# Patient Record
Sex: FEMALE | Race: WHITE | Employment: OTHER | URBAN - METROPOLITAN AREA
[De-identification: names, ages, dates, MRNs, and addresses within clinical notes are randomized per-mention and may not be internally consistent; named-entity substitution may affect disease eponyms.]

---

## 2017-01-15 ENCOUNTER — APPOINTMENT (OUTPATIENT)
Dept: LAB | Facility: HOSPITAL | Age: 72
End: 2017-01-15
Attending: INTERNAL MEDICINE
Payer: COMMERCIAL

## 2017-01-15 DIAGNOSIS — Z86.711 PERSONAL HISTORY OF PE (PULMONARY EMBOLISM): ICD-10-CM

## 2017-01-15 LAB
INR BLD: 2.4 (ref 0.9–1.2)
PROTHROMBIN TIME: 26.3 SECONDS (ref 11.8–14.5)

## 2017-01-15 PROCEDURE — 85610 PROTHROMBIN TIME: CPT

## 2017-01-15 PROCEDURE — 36415 COLL VENOUS BLD VENIPUNCTURE: CPT

## 2017-02-12 ENCOUNTER — APPOINTMENT (OUTPATIENT)
Dept: LAB | Facility: HOSPITAL | Age: 72
End: 2017-02-12
Attending: INTERNAL MEDICINE
Payer: COMMERCIAL

## 2017-02-12 DIAGNOSIS — Z86.711 PERSONAL HISTORY OF PE (PULMONARY EMBOLISM): ICD-10-CM

## 2017-02-12 LAB
INR BLD: 2.2 (ref 0.9–1.2)
PROTHROMBIN TIME: 24.4 SECONDS (ref 11.8–14.5)

## 2017-02-12 PROCEDURE — 36415 COLL VENOUS BLD VENIPUNCTURE: CPT

## 2017-02-12 PROCEDURE — 85610 PROTHROMBIN TIME: CPT

## 2017-03-15 ENCOUNTER — TELEPHONE (OUTPATIENT)
Dept: PULMONOLOGY | Facility: CLINIC | Age: 72
End: 2017-03-15

## 2017-03-15 NOTE — TELEPHONE ENCOUNTER
Letter and Order for April CT from [address] [place] [address] mailed to pt. Managed Care: please process prior auth and inform pt.

## 2017-03-16 PROCEDURE — 85610 PROTHROMBIN TIME: CPT | Performed by: INTERNAL MEDICINE

## 2017-03-16 PROCEDURE — 36415 COLL VENOUS BLD VENIPUNCTURE: CPT | Performed by: INTERNAL MEDICINE

## 2017-03-18 RX ORDER — VALSARTAN 160 MG/1
TABLET ORAL
Qty: 90 TABLET | Refills: 1 | Status: SHIPPED | OUTPATIENT
Start: 2017-03-18 | End: 2017-08-29

## 2017-04-07 ENCOUNTER — LAB ENCOUNTER (OUTPATIENT)
Dept: LAB | Facility: HOSPITAL | Age: 72
End: 2017-04-07
Attending: INTERNAL MEDICINE
Payer: COMMERCIAL

## 2017-04-07 ENCOUNTER — HOSPITAL ENCOUNTER (OUTPATIENT)
Dept: CT IMAGING | Facility: HOSPITAL | Age: 72
Discharge: HOME | End: 2017-04-07
Attending: INTERNAL MEDICINE
Payer: COMMERCIAL

## 2017-04-07 DIAGNOSIS — E11.9 DIABETES MELLITUS (HCC): Primary | ICD-10-CM

## 2017-04-07 DIAGNOSIS — I10 ESSENTIAL HYPERTENSION, MALIGNANT: ICD-10-CM

## 2017-04-07 PROCEDURE — 82565 ASSAY OF CREATININE: CPT

## 2017-04-07 PROCEDURE — 36415 COLL VENOUS BLD VENIPUNCTURE: CPT

## 2017-04-07 PROCEDURE — 71260 CT THORAX DX C+: CPT

## 2017-04-07 PROCEDURE — 83036 HEMOGLOBIN GLYCOSYLATED A1C: CPT

## 2017-04-07 PROCEDURE — 80069 RENAL FUNCTION PANEL: CPT

## 2017-04-13 ENCOUNTER — TELEPHONE (OUTPATIENT)
Dept: PULMONOLOGY | Facility: CLINIC | Age: 72
End: 2017-04-13

## 2017-04-13 DIAGNOSIS — R91.1 PULMONARY NODULE: Primary | ICD-10-CM

## 2017-04-13 NOTE — TELEPHONE ENCOUNTER
----- Message from Chyrl Boas, MD sent at 4/12/2017  9:17 PM CDT -----  RN, is call the patient to let her know that her glycohemoglobin is significantly improved compared to a year ago having fallen from 7.9 value to 6.1.   Her other blood tests are a

## 2017-04-13 NOTE — TELEPHONE ENCOUNTER
----- Message from Rocío Leiva MD sent at 4/12/2017  9:16 PM CDT -----  RN, please call the patient to let her know that her CT scan the chest is stable. The pulmonary since January 2016. She has a tiny thyroid nodule.   Please add to the calendar a

## 2017-04-13 NOTE — TELEPHONE ENCOUNTER
Pt notified of below. Pt verbalized understanding. F/U appt made for 5-9-17 2:30. Pt notified of time date and place of appt. Pt verbalized understanding. Order added to chronic calendar.

## 2017-04-17 PROCEDURE — 36415 COLL VENOUS BLD VENIPUNCTURE: CPT | Performed by: INTERNAL MEDICINE

## 2017-04-17 PROCEDURE — 85610 PROTHROMBIN TIME: CPT | Performed by: INTERNAL MEDICINE

## 2017-05-09 ENCOUNTER — OFFICE VISIT (OUTPATIENT)
Dept: PULMONOLOGY | Facility: CLINIC | Age: 72
End: 2017-05-09

## 2017-05-09 VITALS
BODY MASS INDEX: 34.79 KG/M2 | DIASTOLIC BLOOD PRESSURE: 76 MMHG | OXYGEN SATURATION: 97 % | HEIGHT: 65 IN | WEIGHT: 208.81 LBS | HEART RATE: 66 BPM | SYSTOLIC BLOOD PRESSURE: 135 MMHG | RESPIRATION RATE: 18 BRPM

## 2017-05-09 DIAGNOSIS — I26.92 ACUTE SADDLE PULMONARY EMBOLISM WITHOUT ACUTE COR PULMONALE (HCC): Primary | ICD-10-CM

## 2017-05-09 PROCEDURE — 99212 OFFICE O/P EST SF 10 MIN: CPT | Performed by: INTERNAL MEDICINE

## 2017-05-09 PROCEDURE — 99214 OFFICE O/P EST MOD 30 MIN: CPT | Performed by: INTERNAL MEDICINE

## 2017-05-09 RX ORDER — WARFARIN SODIUM 1 MG/1
TABLET ORAL
Refills: 1 | COMMUNITY
Start: 2017-02-21 | End: 2018-02-13 | Stop reason: ALTCHOICE

## 2017-05-23 PROCEDURE — 85610 PROTHROMBIN TIME: CPT | Performed by: INTERNAL MEDICINE

## 2017-05-23 PROCEDURE — 36415 COLL VENOUS BLD VENIPUNCTURE: CPT | Performed by: INTERNAL MEDICINE

## 2017-08-31 RX ORDER — VALSARTAN 160 MG/1
TABLET ORAL
Qty: 90 TABLET | Refills: 0 | Status: SHIPPED | OUTPATIENT
Start: 2017-08-31 | End: 2018-03-19

## 2017-08-31 RX ORDER — VALSARTAN 160 MG/1
TABLET ORAL
Qty: 90 TABLET | Refills: 0 | Status: SHIPPED | OUTPATIENT
Start: 2017-08-31 | End: 2018-02-13

## 2018-02-13 PROBLEM — E11.9 TYPE 2 DIABETES MELLITUS WITHOUT COMPLICATION, WITHOUT LONG-TERM CURRENT USE OF INSULIN (HCC): Status: ACTIVE | Noted: 2018-02-13

## 2018-02-13 PROBLEM — I10 ESSENTIAL HYPERTENSION: Status: ACTIVE | Noted: 2018-02-13

## 2018-02-20 ENCOUNTER — HOSPITAL ENCOUNTER (OUTPATIENT)
Dept: CT IMAGING | Facility: HOSPITAL | Age: 73
Discharge: HOME | End: 2018-02-20
Attending: INTERNAL MEDICINE
Payer: COMMERCIAL

## 2018-02-20 DIAGNOSIS — R91.1 PULMONARY NODULE: ICD-10-CM

## 2018-02-20 LAB — CREAT BLD-MCNC: 0.7 MG/DL (ref 0.5–1.5)

## 2018-02-20 PROCEDURE — 71260 CT THORAX DX C+: CPT | Performed by: INTERNAL MEDICINE

## 2018-02-20 PROCEDURE — 82565 ASSAY OF CREATININE: CPT

## 2018-02-21 ENCOUNTER — HOSPITAL ENCOUNTER (OUTPATIENT)
Dept: MAMMOGRAPHY | Age: 73
Discharge: HOME | End: 2018-02-21
Attending: INTERNAL MEDICINE
Payer: COMMERCIAL

## 2018-02-21 ENCOUNTER — APPOINTMENT (OUTPATIENT)
Dept: LAB | Facility: HOSPITAL | Age: 73
End: 2018-02-21
Attending: INTERNAL MEDICINE
Payer: COMMERCIAL

## 2018-02-21 DIAGNOSIS — E78.00 HIGH CHOLESTEROL: ICD-10-CM

## 2018-02-21 DIAGNOSIS — Z12.31 ENCOUNTER FOR SCREENING MAMMOGRAM FOR MALIGNANT NEOPLASM OF BREAST: ICD-10-CM

## 2018-02-21 DIAGNOSIS — I10 ESSENTIAL HYPERTENSION: ICD-10-CM

## 2018-02-21 DIAGNOSIS — E11.9 TYPE 2 DIABETES MELLITUS WITHOUT COMPLICATION, WITHOUT LONG-TERM CURRENT USE OF INSULIN (HCC): ICD-10-CM

## 2018-02-21 LAB
ALBUMIN SERPL BCP-MCNC: 4.1 G/DL (ref 3.5–4.8)
ALBUMIN SERPL BCP-MCNC: 4.1 G/DL (ref 3.5–4.8)
ALP SERPL-CCNC: 65 U/L (ref 32–100)
ALT SERPL-CCNC: 31 U/L (ref 14–54)
ANION GAP SERPL CALC-SCNC: 8 MMOL/L (ref 0–18)
AST SERPL-CCNC: 26 U/L (ref 15–41)
BILIRUB DIRECT SERPL-MCNC: 0 MG/DL (ref 0–0.2)
BILIRUB SERPL-MCNC: 0.4 MG/DL (ref 0.3–1.2)
BUN SERPL-MCNC: 11 MG/DL (ref 8–20)
BUN/CREAT SERPL: 15.3 (ref 10–20)
CALCIUM SERPL-MCNC: 9.5 MG/DL (ref 8.5–10.5)
CHLORIDE SERPL-SCNC: 103 MMOL/L (ref 95–110)
CHOLEST SERPL-MCNC: 211 MG/DL (ref 110–200)
CO2 SERPL-SCNC: 27 MMOL/L (ref 22–32)
CREAT SERPL-MCNC: 0.72 MG/DL (ref 0.5–1.5)
CREAT UR-MCNC: 144 MG/DL
GLUCOSE SERPL-MCNC: 126 MG/DL (ref 70–99)
HBA1C MFR BLD: 6.4 % (ref 4–6)
HDLC SERPL-MCNC: 56 MG/DL
LDLC SERPL CALC-MCNC: 128 MG/DL (ref 0–99)
MICROALBUMIN UR-MCNC: 1 MG/DL (ref 0–1.8)
MICROALBUMIN/CREAT UR: 6.9 MG/G{CREAT} (ref 0–20)
NONHDLC SERPL-MCNC: 155 MG/DL
OSMOLALITY UR CALC.SUM OF ELEC: 287 MOSM/KG (ref 275–295)
PHOSPHATE SERPL-MCNC: 3.9 MG/DL (ref 2.4–4.7)
POTASSIUM SERPL-SCNC: 3.8 MMOL/L (ref 3.3–5.1)
PROT SERPL-MCNC: 7.5 G/DL (ref 5.9–8.4)
SODIUM SERPL-SCNC: 138 MMOL/L (ref 136–144)
TRIGL SERPL-MCNC: 134 MG/DL (ref 1–149)
TSH SERPL-ACNC: 4.3 UIU/ML (ref 0.45–5.33)

## 2018-02-21 PROCEDURE — 82043 UR ALBUMIN QUANTITATIVE: CPT

## 2018-02-21 PROCEDURE — 84443 ASSAY THYROID STIM HORMONE: CPT

## 2018-02-21 PROCEDURE — 82570 ASSAY OF URINE CREATININE: CPT

## 2018-02-21 PROCEDURE — 77067 SCR MAMMO BI INCL CAD: CPT | Performed by: INTERNAL MEDICINE

## 2018-02-21 PROCEDURE — 80069 RENAL FUNCTION PANEL: CPT

## 2018-02-21 PROCEDURE — 80061 LIPID PANEL: CPT

## 2018-02-21 PROCEDURE — 83036 HEMOGLOBIN GLYCOSYLATED A1C: CPT

## 2018-02-21 PROCEDURE — 77063 BREAST TOMOSYNTHESIS BI: CPT | Performed by: INTERNAL MEDICINE

## 2018-02-21 PROCEDURE — 36415 COLL VENOUS BLD VENIPUNCTURE: CPT

## 2018-02-21 PROCEDURE — 80076 HEPATIC FUNCTION PANEL: CPT

## 2018-02-22 ENCOUNTER — TELEPHONE (OUTPATIENT)
Dept: PULMONOLOGY | Facility: CLINIC | Age: 73
End: 2018-02-22

## 2018-02-22 NOTE — TELEPHONE ENCOUNTER
----- Message from Grace Mata MD sent at 2/21/2018  9:44 PM CST -----  RN, please call the patient to let her know that the CT scan the chest is stable compared to before and that she does not need any further scans.

## 2018-03-19 RX ORDER — VALSARTAN 160 MG/1
TABLET ORAL
Qty: 90 TABLET | Refills: 1 | Status: SHIPPED | OUTPATIENT
Start: 2018-03-19 | End: 2018-09-06 | Stop reason: RX

## 2018-03-28 ENCOUNTER — TELEPHONE (OUTPATIENT)
Dept: PULMONOLOGY | Facility: CLINIC | Age: 73
End: 2018-03-28

## 2018-04-03 NOTE — TELEPHONE ENCOUNTER
Patient had test completed 02/20/18. Is patient having another CT in April? If so, I think another order would need to be placed. All of her orders are resulted. Thank you!  Nickolas Palomino [phone] [facility]

## 2018-04-06 NOTE — TELEPHONE ENCOUNTER
Pt informed to disregard order for CT as previously completed in feb by alternate MD, pt voiced understanding.

## 2018-05-31 ENCOUNTER — APPOINTMENT (OUTPATIENT)
Dept: LAB | Facility: HOSPITAL | Age: 73
End: 2018-05-31
Attending: INTERNAL MEDICINE
Payer: COMMERCIAL

## 2018-05-31 DIAGNOSIS — E78.00 HIGH CHOLESTEROL: ICD-10-CM

## 2018-05-31 DIAGNOSIS — E11.9 TYPE 2 DIABETES MELLITUS WITHOUT COMPLICATION, WITHOUT LONG-TERM CURRENT USE OF INSULIN (HCC): ICD-10-CM

## 2018-05-31 PROCEDURE — 80061 LIPID PANEL: CPT

## 2018-05-31 PROCEDURE — 83036 HEMOGLOBIN GLYCOSYLATED A1C: CPT

## 2018-05-31 PROCEDURE — 36415 COLL VENOUS BLD VENIPUNCTURE: CPT

## 2018-05-31 PROCEDURE — 80076 HEPATIC FUNCTION PANEL: CPT

## 2018-08-29 ENCOUNTER — APPOINTMENT (OUTPATIENT)
Dept: LAB | Facility: HOSPITAL | Age: 73
End: 2018-08-29
Attending: INTERNAL MEDICINE
Payer: COMMERCIAL

## 2018-08-29 DIAGNOSIS — E78.00 HIGH CHOLESTEROL: ICD-10-CM

## 2018-08-29 DIAGNOSIS — E11.9 TYPE 2 DIABETES MELLITUS WITHOUT COMPLICATION, WITHOUT LONG-TERM CURRENT USE OF INSULIN (HCC): ICD-10-CM

## 2018-08-29 LAB
ALBUMIN SERPL BCP-MCNC: 3.8 G/DL (ref 3.5–4.8)
ALP SERPL-CCNC: 75 U/L (ref 32–100)
ALT SERPL-CCNC: 25 U/L (ref 14–54)
AST SERPL-CCNC: 23 U/L (ref 15–41)
BILIRUB DIRECT SERPL-MCNC: 0.1 MG/DL (ref 0–0.2)
BILIRUB SERPL-MCNC: 0.7 MG/DL (ref 0.3–1.2)
CHOLEST SERPL-MCNC: 188 MG/DL (ref 110–200)
HBA1C MFR BLD: 6.8 % (ref 4–6)
HDLC SERPL-MCNC: 55 MG/DL
LDLC SERPL CALC-MCNC: 108 MG/DL (ref 0–99)
NONHDLC SERPL-MCNC: 133 MG/DL
PROT SERPL-MCNC: 7.3 G/DL (ref 5.9–8.4)
TRIGL SERPL-MCNC: 126 MG/DL (ref 1–149)

## 2018-08-29 PROCEDURE — 36415 COLL VENOUS BLD VENIPUNCTURE: CPT

## 2018-08-29 PROCEDURE — 80076 HEPATIC FUNCTION PANEL: CPT

## 2018-08-29 PROCEDURE — 83036 HEMOGLOBIN GLYCOSYLATED A1C: CPT

## 2018-08-29 PROCEDURE — 80061 LIPID PANEL: CPT

## 2019-02-14 ENCOUNTER — APPOINTMENT (OUTPATIENT)
Dept: LAB | Facility: HOSPITAL | Age: 74
End: 2019-02-14
Attending: UROLOGY
Payer: COMMERCIAL

## 2019-02-14 DIAGNOSIS — I10 ESSENTIAL HYPERTENSION: ICD-10-CM

## 2019-02-14 DIAGNOSIS — E78.00 HIGH CHOLESTEROL: ICD-10-CM

## 2019-02-14 DIAGNOSIS — E11.9 TYPE 2 DIABETES MELLITUS WITHOUT COMPLICATION, WITHOUT LONG-TERM CURRENT USE OF INSULIN (HCC): ICD-10-CM

## 2019-02-14 LAB
ALBUMIN SERPL-MCNC: 3.7 G/DL (ref 3.4–5)
ALBUMIN SERPL-MCNC: 3.7 G/DL (ref 3.4–5)
ALP LIVER SERPL-CCNC: 93 U/L (ref 55–142)
ALT SERPL-CCNC: 25 U/L (ref 13–56)
ANION GAP SERPL CALC-SCNC: 6 MMOL/L (ref 0–18)
AST SERPL-CCNC: 15 U/L (ref 15–37)
BILIRUB DIRECT SERPL-MCNC: 0.1 MG/DL (ref 0–0.2)
BILIRUB SERPL-MCNC: 0.6 MG/DL (ref 0.1–2)
BUN BLD-MCNC: 14 MG/DL (ref 7–18)
BUN/CREAT SERPL: 17.3 (ref 10–20)
CALCIUM BLD-MCNC: 9.6 MG/DL (ref 8.5–10.1)
CHLORIDE SERPL-SCNC: 105 MMOL/L (ref 98–107)
CHOLEST SMN-MCNC: 210 MG/DL (ref ?–200)
CO2 SERPL-SCNC: 28 MMOL/L (ref 21–32)
CREAT BLD-MCNC: 0.81 MG/DL (ref 0.55–1.02)
CREAT UR-SCNC: 137 MG/DL
EST. AVERAGE GLUCOSE BLD GHB EST-MCNC: 148 MG/DL (ref 68–126)
GLUCOSE BLD-MCNC: 131 MG/DL (ref 70–99)
HBA1C MFR BLD HPLC: 6.8 % (ref ?–5.7)
HDLC SERPL-MCNC: 61 MG/DL (ref 40–59)
LDLC SERPL CALC-MCNC: 113 MG/DL (ref ?–100)
M PROTEIN MFR SERPL ELPH: 7.9 G/DL (ref 6.4–8.2)
MICROALBUMIN UR-MCNC: 1.31 MG/DL
MICROALBUMIN/CREAT 24H UR-RTO: 9.6 UG/MG (ref ?–30)
NONHDLC SERPL-MCNC: 149 MG/DL (ref ?–130)
OSMOLALITY SERPL CALC.SUM OF ELEC: 290 MOSM/KG (ref 275–295)
PHOSPHATE SERPL-MCNC: 4.4 MG/DL (ref 2.5–4.9)
POTASSIUM SERPL-SCNC: 4 MMOL/L (ref 3.5–5.1)
SODIUM SERPL-SCNC: 139 MMOL/L (ref 136–145)
TRIGL SERPL-MCNC: 180 MG/DL (ref 30–149)

## 2019-02-14 PROCEDURE — 80061 LIPID PANEL: CPT

## 2019-02-14 PROCEDURE — 36415 COLL VENOUS BLD VENIPUNCTURE: CPT

## 2019-02-14 PROCEDURE — 80069 RENAL FUNCTION PANEL: CPT

## 2019-02-14 PROCEDURE — 82043 UR ALBUMIN QUANTITATIVE: CPT

## 2019-02-14 PROCEDURE — 80076 HEPATIC FUNCTION PANEL: CPT

## 2019-02-14 PROCEDURE — 82570 ASSAY OF URINE CREATININE: CPT

## 2019-02-14 PROCEDURE — 83036 HEMOGLOBIN GLYCOSYLATED A1C: CPT

## 2019-02-20 PROBLEM — E78.00 HIGH CHOLESTEROL: Status: ACTIVE | Noted: 2019-02-20

## 2019-02-20 PROBLEM — E66.01 SEVERE OBESITY (BMI 35.0-35.9 WITH COMORBIDITY) (HCC): Status: ACTIVE | Noted: 2019-02-20

## 2019-05-21 PROBLEM — M19.072 ARTHRITIS OF BOTH FEET: Status: ACTIVE | Noted: 2019-05-21

## 2019-05-21 PROBLEM — M19.071 ARTHRITIS OF BOTH FEET: Status: ACTIVE | Noted: 2019-05-21

## 2019-05-22 PROBLEM — E11.9 DIABETES MELLITUS WITHOUT COMPLICATION (HCC): Status: ACTIVE | Noted: 2018-02-13

## 2019-06-26 PROCEDURE — 88305 TISSUE EXAM BY PATHOLOGIST: CPT | Performed by: RADIOLOGY

## 2021-02-06 DIAGNOSIS — Z23 NEED FOR VACCINATION: ICD-10-CM

## 2021-06-17 PROBLEM — R91.8 MULTIPLE PULMONARY NODULES: Status: ACTIVE | Noted: 2021-06-17

## 2021-06-17 PROBLEM — Z80.3 FAMILY HISTORY OF BREAST CANCER: Status: ACTIVE | Noted: 2021-06-17

## 2021-06-17 PROBLEM — E78.00 HYPERCHOLESTEROLEMIA: Status: ACTIVE | Noted: 2021-06-17

## 2021-06-17 PROBLEM — Z86.711 HISTORY OF PULMONARY EMBOLISM: Status: ACTIVE | Noted: 2021-06-17

## 2021-08-02 ENCOUNTER — GENETICS ENCOUNTER (OUTPATIENT)
Dept: GENETICS | Facility: HOSPITAL | Age: 76
End: 2021-08-02
Attending: GENETIC COUNSELOR, MS
Payer: COMMERCIAL

## 2021-08-02 ENCOUNTER — APPOINTMENT (OUTPATIENT)
Dept: HEMATOLOGY/ONCOLOGY | Facility: HOSPITAL | Age: 76
End: 2021-08-02
Attending: GENETIC COUNSELOR, MS
Payer: COMMERCIAL

## 2021-08-02 DIAGNOSIS — Z80.3 FAMILY HISTORY OF BREAST CANCER: ICD-10-CM

## 2021-08-02 DIAGNOSIS — Z80.41 FAMILY HISTORY OF MALIGNANT NEOPLASM OF OVARY: Primary | ICD-10-CM

## 2021-08-02 PROCEDURE — 96040 HC GENETIC COUNSELING EA 30 MIN: CPT | Performed by: GENETIC COUNSELOR, MS

## 2021-08-02 NOTE — PROGRESS NOTES
Reason for visit: Mrs. Sabine Shrestha is a 43-year-old woman referred for genetic counseling due to her family history of ovarian cancer and breast cancer. She was seen today to discuss the option of genetic testing.   She is  and has a grown son years of oral contraceptive use. She denies any thyroid issues or dermatological concerns but does report a remote history of uterine fibroids. She has had a colonoscopy with negative results.  She considers herself to be in good health aside from her hyp genes are statistically less often seen in hereditary breast cancer. I used a prediction tool based upon 10% of ovarian cancers being hereditary that would give her a 5% risk of having a mutation within a gene associated with hereditary ovarian cancer. prevent from this being a concern in regards to employment or health insurance ([employer]) but long term care, disability and life insurance are not covered by this act, and thus we discussed that she may want to put these in place, if desired, prior to [address]

## 2021-08-13 ENCOUNTER — TELEPHONE (OUTPATIENT)
Dept: SURGERY | Facility: CLINIC | Age: 76
End: 2021-08-13

## 2021-08-13 NOTE — TELEPHONE ENCOUNTER
Returned pt phone call regarding her questions on imaging prior to her appt with Dr. Raza Churchill. Pt is asking about the differences between a diagnostic and screening mamm, and then mammograms vs other breast imaging modalities.   Informed pt of the difference

## 2021-08-25 ENCOUNTER — OFFICE VISIT (OUTPATIENT)
Dept: SURGERY | Facility: CLINIC | Age: 76
End: 2021-08-25
Payer: COMMERCIAL

## 2021-08-25 VITALS
HEIGHT: 65 IN | SYSTOLIC BLOOD PRESSURE: 127 MMHG | BODY MASS INDEX: 35.16 KG/M2 | DIASTOLIC BLOOD PRESSURE: 58 MMHG | WEIGHT: 211 LBS | RESPIRATION RATE: 18 BRPM | OXYGEN SATURATION: 98 % | HEART RATE: 76 BPM

## 2021-08-25 DIAGNOSIS — Z80.3 FAMILY HISTORY OF BREAST CANCER: Primary | ICD-10-CM

## 2021-08-25 DIAGNOSIS — D24.2 FIBROADENOMA OF LEFT BREAST: ICD-10-CM

## 2021-08-25 PROCEDURE — 99204 OFFICE O/P NEW MOD 45 MIN: CPT | Performed by: SURGERY

## 2021-08-25 PROCEDURE — 3074F SYST BP LT 130 MM HG: CPT | Performed by: SURGERY

## 2021-08-25 PROCEDURE — 3008F BODY MASS INDEX DOCD: CPT | Performed by: SURGERY

## 2021-08-25 PROCEDURE — 3078F DIAST BP <80 MM HG: CPT | Performed by: SURGERY

## 2021-08-25 NOTE — PROGRESS NOTES
Breast Surgery New Patient Consultation    This is the first visit for this 77year old woman, referred by Dr. Justyna Bustamante and Dr. Holland Kayser, who presents for evaluation of family history of breast cancer.     History of Present Illness:   Ms. Jane Axon menopause. She denies any history of hormone replacement therapy. She has history of oral contraceptive use for 2 years, last in 1966. She denies infertility treatment to achieve pregnancy.     Medications:    No outpatient medications have been marked a is no history of chest pain, chest pressure/discomfort, palpitations, irregular heartbeat, fainting or near-fainting, difficulty breathing when lying flat, SOB/Coughing at night, swelling of the legs or chest pain while walking.     Breasts:  See history of SpO2 98%   BMI 35.11 kg/m²     Physical Exam:  The patient is an alert, oriented, well-nourished and  well-developed woman who appears her stated age. Her speech patterns and movements are normal. Her affect is appropriate.     HEENT: The head is normoceph On exam today I found no clinical evidence of malignancy bilaterally. I personally reviewed her recent imaging which is concordant with her clinical exam today.   She does have a mild intertrigo to the right inframammary fold and I have advised she use [address]

## 2021-08-31 ENCOUNTER — HOSPITAL ENCOUNTER (OUTPATIENT)
Dept: ULTRASOUND IMAGING | Facility: HOSPITAL | Age: 76
Discharge: HOME | End: 2021-08-31
Attending: INTERNAL MEDICINE
Payer: COMMERCIAL

## 2021-08-31 DIAGNOSIS — E04.1 LEFT THYROID NODULE: ICD-10-CM

## 2021-08-31 PROCEDURE — 76536 US EXAM OF HEAD AND NECK: CPT | Performed by: INTERNAL MEDICINE

## 2022-03-18 ENCOUNTER — HOSPITAL ENCOUNTER (OUTPATIENT)
Dept: MRI IMAGING | Facility: HOSPITAL | Age: 77
Discharge: HOME | End: 2022-03-18
Attending: PHYSICAL MEDICINE & REHABILITATION
Payer: COMMERCIAL

## 2022-03-18 DIAGNOSIS — M79.602 LEFT ARM PAIN: ICD-10-CM

## 2022-03-18 PROCEDURE — 72141 MRI NECK SPINE W/O DYE: CPT | Performed by: PHYSICAL MEDICINE & REHABILITATION

## 2022-03-19 NOTE — PROGRESS NOTES
Multinodular thyroid. Follow-up nonemergent thyroid ultrasound advised per radiology.  Pt is to f/u PCP- re thyroid findings

## 2024-10-07 NOTE — H&P (VIEW-ONLY)
[facility]-Gastroenterology    Patient Name: Tyra Chandra  [id number]  YOB: 1945    Chief Complaint: Patient presents with:  Diarrhea    PCP: Trish Conn MD    Referring MD: Fabien    Date of last visit: Visit date not found     HPI:     Tyra Chandra is a 79 year old female with a PMH of htn, hlp, PE, DM2, bmi of 31presenting with     Diarrhea end of August:  an hour after she eats (eats about 2-3x per day and has 2-3 watery bms with urgency but denies abdominal pain)    No blood/black/oily    Urgency, no pain but lower abdominal comfortable    6-8 lbs down     ROS: Negative unless mentioned above in HPI:  Fevers, chills, night sweats, weight loss nor weight gain.    Nausea, vomiting.  Constipation, persistent changes in caliber of stool, hematochezia  Dysphagia, heartburn, early satiety, hematemesis, melena  Jaundice  Abdominal pain    History of obstructive sleep apnea: No  History of respiratory illness/home oxygen supplementation: No  History of cardiac illness/pacemaker/AICD/blood thinners: No  History of anxiety/depression: No  History of chronic narcotic pain medication use: No  History of diabetes: YES  History of mobility issues: No  History of C-Diff Colitis:No  History of MRSA: No  History of abdominal surgeries: benign abdominal mass    ENDOSCOPIC REPORTS:   Colonoscopy:2018  EGD:none  ERCP:    History of Prep or Sedation intolerance with previous endoscopic procedures: None    Medication History including herbals, supplements, and otc:   Potassium Chloride ER 10 MEQ Oral Tab CR, Take 1 tablet (10 mEq total) by mouth 2 (two) times daily. (Patient not taking: Reported on 10/7/2024), Disp: 30 tablet, Rfl: 0    rosuvastatin 10 MG Oral Tab, TAKE 1 TABLET(10 MG) BY MOUTH EVERY NIGHT (Patient not taking: Reported on 9/19/2024), Disp: 90 tablet, Rfl: 3    olmesartan 20 MG Oral Tab, Take 1 tablet (20 mg total) by mouth 2 (two) times a day. (Patient not  taking: Reported on 2024), Disp: 180 tablet, Rfl: PRN    metFORMIN 500 MG Oral Tab, TAKE 1 TABLET(500 MG) BY MOUTH TWICE DAILY WITH MEALS (Patient not taking: Reported on 2024), Disp: 180 tablet, Rfl: 3    Multiple Vitamins-Minerals (PRESERVISION AREDS) Oral Tab, Take by mouth. (Patient not taking: Reported on 2024), Disp: , Rfl:     aspirin 81 MG Oral Tab, Take 81 mg by mouth daily. (Patient not taking: Reported on 2024), Disp: , Rfl:     Ciprofloxacin HCl 500 MG Oral Tab, Take 500 mg by mouth. Take 1 tablet  1/2 hour before dental appt (Patient not taking: Reported on 2024), Disp: , Rfl:     Fluticasone Propionate 50 MCG/ACT Nasal Suspension, , Disp: , Rfl: 0    DiphenhydrAMINE HCl 25 MG Oral Cap, Take 25 mg by mouth every 6 (six) hours as needed. (Patient not taking: Reported on 2024), Disp: , Rfl:     No current facility-administered medications on file prior to visit.       Penicillins             SWELLING  Strawberries            ITCHING  Clindamycin             PALPITATIONS    Past Medical History:   Diagnosis Date   • Diabetes (HCC)    • Essential hypertension    • Family history of breast cancer in mother    • Family history of ovarian cancer     daughter  in 46   • History of hip replacement, total, left     Dr. Gonzalez   • History of pulmonary embolism        • Hypercholesteremia    • Multinodular thyroid    • Multiple pulmonary nodules 2021    Unchanged nodules with surveillance from 2016. No further imaging recommended.   • Obesity (BMI 30-39.9)      Past Surgical History:   Procedure Laterality Date   • COLONOSCOPY  2018   • HIP REPLACEMENT SURGERY  2012   • NEEDLE BIOPSY LEFT     • OTHER SURGICAL HISTORY      removal of benign abdominal mass   • TONSILLECTOMY       Social History    Socioeconomic History      Marital status:     Tobacco Use      Smoking status: Never        Passive exposure: Never      Smokeless tobacco: Never     Vaping Use      Vaping status: Never Used    Substance and Sexual Activity      Alcohol use: Never        Alcohol/week: 0.0 standard drinks of alcohol      Drug use: No    Family History of GI/Pancreas/Liver cancers or illnesses:  No hx of GI tract cancers, IBD, Celiac except as noted  Mother had hx of anal fissure?    Family History   Problem Relation Age of Onset   • Breast Cancer Mother 47        did b/l mast; had new dx 86 yo   • Ovarian Cancer Daughter 46   • Breast Cancer Maternal Aunt 50        50   • Breast Cancer Maternal Cousin Female 78        78   • Breast Cancer Maternal Cousin Female 52        52   • Pancreatic Cancer Maternal Cousin Male         son shon Harman      REVIEW OF SYSTEMS:     GENERAL: feels well otherwise.  SKIN: denies any unusual skin lesions  EYES: denies any new visual changes or double vision, denies red painful eyes  HEENT: denies any new hearing changes, nasal congestion, sinus pain  LUNGS: denies shortness of breath with exertion or persistent cough  CARDIOVASCULAR: denies chest pain on exertion  GI: as above  : denies dysuria, nocturia or changes in stream  MUSCULOSKELETAL: denies new myalgias, swelling  NEURO: denies new sensory or motor deficits.  PSYCHE: denies depression or anxiety  HEMATOLOGIC: denies bleeding or bruising    PHYSICAL EXAM:     Ht 5' 5\" (1.651 m)   Wt 181 lb (82.1 kg)   BMI 30.12 kg/m²    Wt Readings from Last 3 Encounters:  10/07/24 : 181 lb (82.1 kg)  09/19/24 : 188 lb 3.2 oz (85.4 kg)  08/01/24 : 187 lb 6.4 oz (85 kg)    General: well developed, well nourished, in no apparent distress  HEENT:  Conjunctiva clear; mucous membranes moist  Neck: supple, symmetric; no cervical/supraclavicular lymphadenopathy  CV: S1, S2 normal; RRR; no pedal edema  Lungs: non-labored; CTAB  Abdomen: normoactive BS; soft; nontender, non-distended; no HSM or masses  Rectal: deferred  Neuro: no gross deficit, gait intact  Skin: warm, dry and intact  MSK: moving all extremities;  no deformity  Psych: appropriate mood/affect    LABS:     Relevant in last 3 months:  CMP  Lab Results   Component Value Date    FASTING No 09/24/2024     09/24/2024    BUN 10.0 09/24/2024    CREATSERUM 0.45 (L) 09/24/2024     09/24/2024    K 3.4 (L) 09/24/2024     (H) 09/24/2024    CO2 21.7 (L) 09/24/2024    TP 6.1 (L) 09/24/2024    ALB 3.4 (L) 09/24/2024    BILT 0.47 09/24/2024    ALKPHO 77 09/24/2024    AST 24 09/24/2024    ALT 23 09/24/2024    GFRCKDEPI 96.28 09/24/2024     CBC  Lab Results   Component Value Date    WBC 5.37 09/24/2024    RBC 4.54 09/24/2024    HGB 13.2 09/24/2024    HCT 39.3 09/24/2024    MCV 86.6 09/24/2024    MCH 29.1 09/24/2024    MCHC 33.6 09/24/2024     09/24/2024    RDW 13.3 09/24/2024    MPVCBC 11.1 09/24/2024    NE 3.09 09/24/2024    LYMABS 1.69 09/24/2024    MOABSO 0.45 09/24/2024    EOABSO 0.10 09/24/2024    BAABSO 0.04 09/24/2024    NUCRBCABS 0.000 09/24/2024    NEPERCENT 57.5 09/24/2024    LYPERCENT 31.5 09/24/2024    MOPERCENT 8.4 09/24/2024    EOPERCENT 1.9 09/24/2024    BAPERCENT 0.7 09/24/2024     TSH   No results found for: \"TSH\"    IMAGING:     No results found. :  No relevant imaging    ASSESSMENT AND PLAN:   Tyra Chandra is a 79 year old female with   Alaina was seen today for diarrhea.    Diagnoses and all orders for this visit:    Diarrhea, unspecified type - hold off on further stool studies until procedures (patient does not wish to do more at this time), if biopsies neg for colitis then consider SIBO and panc insuff work up; blood test to r/o celiac  -     EVALUATE & TREAT, GASTRO (DULY)  -     COLONOSCOPY DIAGNOSTIC - REFERRAL  -     Na Sulfate-K Sulfate-Mg Sulf (SUPREP BOWEL PREP KIT) 17.5-3.13-1.6 GM/177ML Oral Solution; Split dose, take as directed  -     CELIAC DISEASE AB EXPANDED PNL; Future  -     UPPER GI ENDOSCOPY - REFERRAL    Labs and imaging reviewed with patient.  Medical record reviewed prior to patient interview.  Total  time was 45 minutes    Special Considerations Prior to Endoscopic Exam  Sedation/Anesthesia Plan: MAC  Anticoagulants none    The risks and benefits of my recommendations, as well as other treatment options were discussed with the patient today. Questions were answered. The patient indicates understanding of these issues and agrees to the plan.    Plan for egd/oc with possible biopsies and possible polypectomy.  The procedure, indications, and risks (including perforation, bleeding, infection, and sedation related complications) were discussed.      suprep preparation, use discussed and instructions provided.     Follow up:  1 to 3 months     Muna Shrestha MD  Gastroenterologist  [facility]    Note to patient: The 21st Century Cures Act makes medical notes like these available to patients in the interest of transparency. However, this is a medical document intended as peer to peer communication. It is written in medical language and may contain abbreviations or verbiage that are unfamiliar. It may appear blunt or direct. Medical documents are intended to carry relevant information, facts as evident, and the clinical opinion of the practitioner.

## 2024-10-11 ENCOUNTER — HOSPITAL ENCOUNTER (OUTPATIENT)
Facility: HOSPITAL | Age: 79
Setting detail: HOSPITAL OUTPATIENT SURGERY
Discharge: HOME | End: 2024-10-11
Attending: INTERNAL MEDICINE | Admitting: INTERNAL MEDICINE
Payer: COMMERCIAL

## 2024-10-11 ENCOUNTER — ANESTHESIA EVENT (OUTPATIENT)
Dept: ENDOSCOPY | Facility: HOSPITAL | Age: 79
End: 2024-10-11
Payer: COMMERCIAL

## 2024-10-11 ENCOUNTER — ANESTHESIA (OUTPATIENT)
Dept: ENDOSCOPY | Facility: HOSPITAL | Age: 79
End: 2024-10-11
Payer: COMMERCIAL

## 2024-10-11 VITALS
SYSTOLIC BLOOD PRESSURE: 128 MMHG | HEIGHT: 65 IN | OXYGEN SATURATION: 98 % | DIASTOLIC BLOOD PRESSURE: 64 MMHG | RESPIRATION RATE: 17 BRPM | BODY MASS INDEX: 29.99 KG/M2 | WEIGHT: 180 LBS | HEART RATE: 71 BPM

## 2024-10-11 DIAGNOSIS — R19.7 DIARRHEA, UNSPECIFIED: ICD-10-CM

## 2024-10-11 LAB — GLUCOSE BLDC GLUCOMTR-MCNC: 110 MG/DL (ref 70–99)

## 2024-10-11 PROCEDURE — 0DB98ZX EXCISION OF DUODENUM, VIA NATURAL OR ARTIFICIAL OPENING ENDOSCOPIC, DIAGNOSTIC: ICD-10-PCS | Performed by: INTERNAL MEDICINE

## 2024-10-11 PROCEDURE — 0DBE8ZX EXCISION OF LARGE INTESTINE, VIA NATURAL OR ARTIFICIAL OPENING ENDOSCOPIC, DIAGNOSTIC: ICD-10-PCS | Performed by: INTERNAL MEDICINE

## 2024-10-11 PROCEDURE — 0DB68ZX EXCISION OF STOMACH, VIA NATURAL OR ARTIFICIAL OPENING ENDOSCOPIC, DIAGNOSTIC: ICD-10-PCS | Performed by: INTERNAL MEDICINE

## 2024-10-11 PROCEDURE — 88305 TISSUE EXAM BY PATHOLOGIST: CPT | Performed by: INTERNAL MEDICINE

## 2024-10-11 PROCEDURE — 88342 IMHCHEM/IMCYTCHM 1ST ANTB: CPT | Performed by: INTERNAL MEDICINE

## 2024-10-11 PROCEDURE — 82962 GLUCOSE BLOOD TEST: CPT

## 2024-10-11 PROCEDURE — 0DB88ZX EXCISION OF SMALL INTESTINE, VIA NATURAL OR ARTIFICIAL OPENING ENDOSCOPIC, DIAGNOSTIC: ICD-10-PCS | Performed by: INTERNAL MEDICINE

## 2024-10-11 PROCEDURE — 88312 SPECIAL STAINS GROUP 1: CPT | Performed by: INTERNAL MEDICINE

## 2024-10-11 RX ORDER — LIDOCAINE HYDROCHLORIDE 10 MG/ML
INJECTION, SOLUTION EPIDURAL; INFILTRATION; INTRACAUDAL; PERINEURAL AS NEEDED
Status: DISCONTINUED | OUTPATIENT
Start: 2024-10-11 | End: 2024-10-11 | Stop reason: SURG

## 2024-10-11 RX ORDER — SODIUM CHLORIDE, SODIUM LACTATE, POTASSIUM CHLORIDE, CALCIUM CHLORIDE 600; 310; 30; 20 MG/100ML; MG/100ML; MG/100ML; MG/100ML
INJECTION, SOLUTION INTRAVENOUS CONTINUOUS
Status: DISCONTINUED | OUTPATIENT
Start: 2024-10-11 | End: 2024-10-11

## 2024-10-11 RX ORDER — NALOXONE HYDROCHLORIDE 0.4 MG/ML
0.08 INJECTION, SOLUTION INTRAMUSCULAR; INTRAVENOUS; SUBCUTANEOUS ONCE AS NEEDED
Status: DISCONTINUED | OUTPATIENT
Start: 2024-10-11 | End: 2024-10-11

## 2024-10-11 RX ADMIN — LIDOCAINE HYDROCHLORIDE 50 MG: 10 INJECTION, SOLUTION EPIDURAL; INFILTRATION; INTRACAUDAL; PERINEURAL at 07:32:00

## 2024-10-11 NOTE — DISCHARGE INSTRUCTIONS
ENDOSCOPY DISCHARGE INSTRUCTIONS    Procedure Performed:   Gastroscopy and Colonoscopy    Endoscopist: Muna Shrestha MD  FINDINGS:   Esophagitis (inflamation of the esophagus lining), Gastritis (inflammation of the stomach lining), Ulcer in the stomach/duodenum (small bowel), Internal/external hemorrhoids, and Diverticulosis (pockets in colon that develop with age and lack of fiber intake)    MEDICATIONS:  You may resume all other medications today - see below    DIET:  Regular - see below    BIOPSIES:  Biopsies were taken (you will be notified of results in 7-10 days)    X-RAYS:   No X-Rays were ordered today    Recommendations:   1.High Fiber diet:  30 grams of fiber a day (increase by 3 grams a week in the next 2-3 months) to decrease complications of diverticulosis  2.Changes to medications:  -Start acid suppressing medication (omeprazole 20 mg):  Take 1 tablet by mouth 30 minutes before breakfast and 30 minutes before dinner daily for 8 weeks (ordered)  -Avoid/limit NSAIDS (ibuprofen, aspirin, aleve, motrin, and others in the family) for one week to decrease postpolypectomy bleeding risk  -Avoid/limit aspirin and aspirin containing products for one week to decrease postpolypectomy bleeding risk  3.Repeat colonoscopy per screening requirements  4.Call GI clinic in 7-10 days if you do not hear from regarding your biopsy results    Activity for remainder of today:    REST TODAY  DO NOT drive or operate heavy machinery  DO NOT drink any alcoholic beverages  DO NOT sign any legal documents or make any important decisions    After your procedure(s):  It is not unusual to feel bloated or gassy .  Passing gas and belching is encouraged. Lying on your left side with your knees flexed may relieve the discomfort. A hot pack to the abdomen may also help.    After your gastroscopy (upper endoscopy): You may experience a slight sore throat which will subside. Throat lozenges or salt water gargle can be  used.    FOLLOW-UP:  Contact the office at [phone] for follow-up appointment is needed or if you develop any of the following:    Severe abdominal pain/discomfort     Excessive bleeding                     Black tarry stool    Difficulty breathing/swallowing      Persistent nausea/vomiting  Fever above 100 degrees or chills

## 2024-10-11 NOTE — ANESTHESIA POSTPROCEDURE EVALUATION
Patient: Tyra Chandra    Procedure Summary       Date: 10/11/24 Room / Location: [facility] ENDOSCOPY 01 / [facility] ENDOSCOPY    Anesthesia Start: 0729 Anesthesia Stop: 0817    Procedures:       COLONOSCOPY      ESOPHAGOGASTRODUODENOSCOPY (EGD) Diagnosis:       Diarrhea, unspecified      (Egd: gastritis with erosions,Duodenal and Gastric Ulcers, mild esophagitis, Colonscopy: ileuitis)    Surgeons: Muna Shrestha MD Anesthesiologist: Sara Mccullough CRNA    Anesthesia Type: MAC ASA Status: 2            Anesthesia Type: MAC    Vitals Value Taken Time   /79 10/11/24 0820   Temp 97.3 10/11/24 0823   Pulse 71 10/11/24 0822   Resp 16 10/11/24 0822   SpO2 95 % 10/11/24 0822   Vitals shown include unfiled device data.    [facility] AN Post Evaluation:   Patient Evaluated in PACU  Patient Participation: complete - patient cannot participate  Level of Consciousness: awake and alert  Pain Score: 0  Pain Management: adequate  Nausea/Vomiting: none  Respiratory Status: acceptable  Postoperative Hydration acceptable      Sara Mccullough CRNA  10/11/2024 8:23 AM

## 2024-10-11 NOTE — INTERVAL H&P NOTE
Pre-op Diagnosis: Diarrhea, unspecified    Plan for colonoscopy/endoscopy with possible biopsies and possible polypectomy.  The procedure, indications, and risks (including perforation, bleeding, infection, and sedation related complications) were discussed.      The above referenced H&P was reviewed by Muna Shrestha MD on 10/11/2024, the patient was examined and no significant changes have occurred in the patient's condition since the H&P was performed.  I discussed with the patient and/or legal representative the potential benefits, risks and side effects of this procedure; the likelihood of the patient achieving goals; and potential problems that might occur during recuperation.  I discussed reasonable alternatives to the procedure, including risks, benefits and side effects related to the alternatives and risks related to not receiving this procedure.  We will proceed with procedure as planned.

## 2024-10-11 NOTE — ANESTHESIA PREPROCEDURE EVALUATION
Anesthesia PreOp Note    HPI:     Tyra Chandra is a 79 year old female who presents for preoperative consultation requested by: Muna Shrestha MD    Date of Surgery: 10/11/2024    Procedure(s):  COLONOSCOPY/ ESOPHAGOGASTRODUODENOSCOPY  ESOPHAGOGASTRODUODENOSCOPY (EGD)  Indication: Diarrhea, unspecified    Relevant Problems   No relevant active problems       NPO:                         History Review:  Patient Active Problem List    Diagnosis Date Noted    Family history of malignant neoplasm of ovary 2021    Hypercholesterolemia 2021    History of pulmonary embolism 2021    Multiple pulmonary nodules 2021    Family history of breast cancer 2021    Severe obesity (BMI 35.0-35.9 with comorbidity) (HCC) 2019    Essential hypertension 2018       Past Medical History:    Diabetes (HCC)    Essential hypertension    Family history of breast cancer in mother    Family history of ovarian cancer    daughter  in 46    High blood pressure    High cholesterol    History of hip replacement, total, left    Dr. Gonzalez    History of pulmonary embolism        Hypercholesteremia    Obesity (BMI 30-39.9)    Thyroid nodule    left nodule over 1 cm       Past Surgical History:   Procedure Laterality Date    Benign biopsy left  2019    Colonoscopy  2018    Hip replacement surgery  2012    Needle biopsy left      Other surgical history      removal of benign abdominal mass    Tonsillectomy         Prescriptions Prior to Admission[1]  Current Medications and Prescriptions Ordered in Epic[2]    Allergies[3]    Family History   Problem Relation Age of Onset    Breast Cancer Mother 47        did b/l mast; had new dx 86 yo    Ovarian Cancer Daughter 46    Breast Cancer Maternal Aunt 50        dtr  of br ca, son  of panc ca    Breast Cancer Maternal Cousin Female 78        dtr of Ghazal    Breast Cancer Maternal Cousin Female 52    Pancreatic Cancer  Maternal Cousin Male         son of Ghazal     Social History     Socioeconomic History    Marital status:    Tobacco Use    Smoking status: Never    Smokeless tobacco: Never   Vaping Use    Vaping status: Never Used   Substance and Sexual Activity    Alcohol use: Never     Alcohol/week: 0.0 standard drinks of alcohol    Drug use: No       Available pre-op labs reviewed.             Vital Signs:  Body mass index is 29.95 kg/m².   height is 1.651 m (5' 5\") and weight is 81.6 kg (180 lb).   Vitals:    10/08/24 1607   Weight: 81.6 kg (180 lb)   Height: 1.651 m (5' 5\")        Anesthesia Evaluation     Patient summary reviewed and Nursing notes reviewed    No history of anesthetic complications   Airway   Mallampati: II  TM distance: <3 FB  Neck ROM: full  Dental - Dentition appears grossly intact     Pulmonary - negative ROS and normal exam   Cardiovascular - normal exam  Exercise tolerance: good  (+) hypertension well controlled    Neuro/Psych - negative ROS     GI/Hepatic/Renal    (+) GERD    Endo/Other    (+) diabetes mellitus type 2 well controlled  Abdominal  - normal exam                 Anesthesia Plan:   ASA:  2  Plan:   MAC  Plan Comments: MAC as primary discussed GA as backup  Informed Consent Plan and Risks Discussed With:  Patient      I have informed Tyra Perry Corazon and/or legal guardian or family member of the nature of the anesthetic plan, benefits, risks including possible dental damage if relevant, major complications, and any alternative forms of anesthetic management.   All of the patient's questions were answered to the best of my ability. The patient desires the anesthetic management as planned.  Sara Mccullough CRNA  10/11/2024 6:45 AM  Present on Admission:  **None**           [1]   Medications Prior to Admission   Medication Sig Dispense Refill Last Dose/Taking    METFORMIN 500 MG Oral Tab TAKE 1 TABLET(500 MG) BY MOUTH TWICE DAILY WITH MEALS 180 tablet 1 Taking    ROSUVASTATIN 10  MG Oral Tab TAKE 1 TABLET(10 MG) BY MOUTH EVERY NIGHT 90 tablet 1 Taking    Olmesartan Medoxomil 20 MG Oral Tab Take 1 tablet (20 mg total) by mouth daily. 90 tablet 3 Taking    aspirin 81 MG Oral Tab Take 1 tablet (81 mg total) by mouth daily.   Taking    acetaminophen 325 MG Oral Tab Take 1 tablet (325 mg total) by mouth every 6 (six) hours as needed for Pain.   Taking As Needed    Fluticasone Propionate 50 MCG/ACT Nasal Suspension   0 Taking    DiphenhydrAMINE HCl 25 MG Oral Cap Take 1 capsule (25 mg total) by mouth every 6 (six) hours as needed.   Taking As Needed    Ciprofloxacin HCl 500 MG Oral Tab Take 1 tablet (500 mg total) by mouth. Take 1 tablet  1/2 hour before dental appt   prn   [2]   Current Facility-Administered Medications Ordered in Epic   Medication Dose Route Frequency Provider Last Rate Last Admin    lactated ringers infusion   Intravenous Continuous Muna Shrestha MD         No current [facility]-ordered outpatient medications on file.   [3]   Allergies  Allergen Reactions    Penicillins SWELLING    Strawberries ITCHING    Clindamycin PALPITATIONS

## 2024-10-11 NOTE — OPERATIVE REPORT
EGD/Colonoscopy Operative Report    Tyra Chandra Patient Status:  Hospital Outpatient Surgery    1945 MRN [id number]   Location [facility] ENDOSCOPY LAB SUITES Attending Muna Shrestha MD   Hosp Day #   0 PCP Trish Conn MD     Pre-Operative Diagnosis: Diarrhea, unspecified     Post-Operative Diagnosis:    ESOPHAGUS:  mild esophagitis with irregular gej at level of gej   STOMACH:  erosions/superficial ulcers in body and antrum and gastritis - biopsies taken   DUODENUM:  superficial ulcers/erosions in bulb/c sweep, no high risk stigmata - biopsie staken   COLON:     1.Terminal Ileum: mild erythema in TI - biopsies taken   2.Colon    -left sided diverticulosis   -internal hemorrhoids   -otherwise normal, random biopsies taken    Procedure Performed:  EGD with biopsies  COLONOSCOPY with biopsies    Informed Consent: Informed consent for both the procedure and sedation were obtained from the patient. The potentially life-threatening complications of sedation, bleeding,  perforation, transfusion or repeat endoscopy were reviewed along with the possible need for hospitalization, surgical management, transfusion or repeat endoscopy should one of these complications arise. The patient understands and is agreeable to proceed.  Sedation Type: MAC-Patient received sedation with monitored anesthesia provided by an anesthesiologist    Cecum Withdrawal Time:  9 minutes    Date of previous colonoscopy: yes    Procedure Description: The patient was placed in the left lateral decubitus position. A bite block was placed in the patient’s mouth. The endoscope was inserted through the mouth and advanced under direct visualization to the descending duodenum and was then withdrawn to examine the duodenal bulb and gastric antrum.  The endoscope was then retroflexed to examine the angulus, GE junction, cardia, body and fundus,  then withdrawn to examine the  esophagus. The endoscope was then removed from the patient. The patient tolerated the procedure well with no immediate complications. The patient was then repositioned for colonoscopy.  After careful digital rectal examination, the Pediatric colonoscope was inserted into the rectum and advanced to the level of the cecum under direct visualization. The cecum was identified by landmarks, including the appendiceal orifice and ileoceccal valve. Careful examination of the entire colon was performed during withdrawal of the endoscope. The scope was withdrawn to the rectum and retroflexion was performed.  The patient tolerated the procedure well with no immediate complications. The patient was transferred to the recovery area in stable condition.   Quality of Preparation: Adequate for diagnostic colonoscopy  Aronchick Bowel Prep Scale: 2/2/2    Findings: see  Recommendations:   1.High Fiber diet:  30 grams of fiber a day (increase by 3 grams a week in the next 2-3 months) to decrease complications of diverticulosis  2.Changes to medications:  -Start acid suppressing medication (omeprazole 20 mg):  Take 1 tablet by mouth 30 minutes before breakfast and 30 minutes before dinner daily for 8 weeks (ordered)  -Avoid/limit NSAIDS (ibuprofen, aspirin, aleve, motrin, and others in the family) for one week to decrease postpolypectomy bleeding risk  -Avoid/limit aspirin and aspirin containing products for one week to decrease postpolypectomy bleeding risk  3.Repeat colonoscopy per screening requirements  4.Call GI clinic in 7-10 days if you do not hear from regarding your biopsy results  Discharge:  The patient was given an after visit summary detailing the procedure, findings, recommendations and follow up plans.    Muna Shrestha MD  10/11/2024  7:47 AM

## (undated) DEVICE — KIT ENDO ORCAPOD 160/180/190

## (undated) DEVICE — CO2 CANNULA,SSOFT,ADLT,7O2,4CO2,FEMALE: Brand: MEDLINE

## (undated) DEVICE — GIJAW SINGLE-USE BIOPSY FORCEPS WITH NEEDLE: Brand: GIJAW

## (undated) DEVICE — V2 SPECIMEN COLLECTION MANIFOLD KIT: Brand: NEPTUNE

## (undated) DEVICE — YANKAUER,BULB TIP,W/O VENT,RIGID,STERILE: Brand: MEDLINE

## (undated) DEVICE — CONMED SCOPE SAVER BITE BLOCK, 20X27 MM: Brand: SCOPE SAVER

## (undated) DEVICE — SYRINGE, LUER SLIP, STERILE, 60ML: Brand: MEDLINE

## (undated) DEVICE — MEDI-VAC NON-CONDUCTIVE SUCTION TUBING 6MM X 1.8M (6FT.) L: Brand: CARDINAL HEALTH

## (undated) DEVICE — KIT CLEAN ENDOKIT 1.1OZ GOWNX2

## (undated) DEVICE — MEDI-VAC NON-CONDUCTIVE SUCTION TUBING: Brand: CARDINAL HEALTH

## (undated) NOTE — Clinical Note
3/15/2017    Dear Al Barnes,     It has come to our attention that the enclosed required test is due in April. This test was ordered by Dr. Bret Spurling. This test requires a prior authorization.  The [facility] Care Department at [phone] will obt

## (undated) NOTE — Clinical Note
May 10, 2017         Dianna Hopkins MD  [address] [facility] [address]      Patient: Damian Tate   YOB: 1945   Date of Visit: 5/9/2017       Dear Afua Garcia,    As you know, Lelia Nguyen is a 67-year-old female who c [facility] MEDICAL OFFICE [facility], [address] [address]    Document electronically generated by:  Coy Thrasher

## (undated) NOTE — LETTER
[facility]  Administration of Anesthesia  I, Tyra Chandra agree to be cared for by a physician anesthesiologist alone and/or with a nurse anesthetist, who is specially trained to monitor me and give me medicine to put me to sleep or keep me comfortable during my procedure    I understand that my anesthesiologist and/or anesthetist is not an employee or agent of [facility] or [employer]. He or she works for [facility]    As the patient asking for anesthesia services, I agree to:  Allow the anesthesiologist (anesthesia doctor) to give me medicine and do additional procedures as necessary. Some examples are: Starting or using an “IV” to give me medicine, fluids or blood during my procedure, and having a breathing tube placed to help me breathe when I’m asleep (intubation). In the event that my heart stops working properly, I understand that my anesthesiologist will make every effort to sustain my life, unless otherwise directed by [facility] Do Not Resuscitate documents.  Tell my anesthesia doctor before my procedure:  If I am pregnant.  The last time that I ate or drank.  iii. All of the medicines I take (including prescriptions, herbal supplements, and pills I can buy without a prescription (including street drugs/illegal medications). Failure to inform my anesthesiologist about these medicines may increase my risk of anesthetic complications.  iv.If I am allergic to anything or have had a reaction to anesthesia before.  I understand how the anesthesia medicine will help me (benefits).  I understand that with any type of anesthesia medicine there are risks:  The most common risks are: nausea, vomiting, sore throat, muscle soreness, damage to my eyes, mouth, or teeth (from breathing tube placement).  Rare risks include: remembering what happened during my procedure, allergic reactions to medications, injury to my airway, heart, lungs, vision,  nerves, or muscles and in extremely rare instances death.  My doctor has explained to me other choices available to me for my care (alternatives).  Pregnant Patients (“epidural”):  I understand that the risks of having an epidural (medicine given into my back to help control pain during labor), include itching, low blood pressure, difficulty urinating, headache or slowing of the baby’s heart. Very rare risks include infection, bleeding, seizure, irregular heart rhythms and nerve injury.  Regional Anesthesia (“spinal”, “epidural”, & “nerve blocks”):  I understand that rare but potential complications include headache, bleeding, infection, seizure, irregular heart rhythms, and nerve injury.    _____________________________________________________________________________  Patient (or Representative) Signature/Relationship to Patient  Date   Time    _____________________________________________________________________________   Name (if used)    Language/Organization   Time    _____________________________________________________________________________  Nurse Anesthetist Signature     Date   Time  _____________________________________________________________________________  Anesthesiologist Signature     Date   Time  I have discussed the procedure and information above with the patient (or patient’s representative) and answered their questions. The patient or their representative has agreed to have anesthesia services.    _____________________________________________________________________________  Witness        Date   Time  I have verified that the signature is that of the patient or patient’s representative, and that it was signed before the procedure  Patient Name: Tyra Chandra     : 1945                 Printed: 10/9/2024 at 5:11 PM    Medical Record #: [id number]                                            Page 1 of 1  ----------ANESTHESIA CONSENT----------

## (undated) NOTE — LETTER
3/28/2018    Dear Vaishnavi Villagran,     It has come to our attention that the enclosed required test is due in April. This test was ordered by Dr. Kezia Lafleur. This test requires a prior authorization.  The [facility] Department at [phone] will obt

## (undated) NOTE — MR AVS SNAPSHOT
[facility]  [address] [place] [address] [phone]               Thank you for choosing us for your health care visit with Honey Westfall MD.  We are glad to serve you and happy to provide you with this summary of your visit. Commonly known as:  DIOVAN           * Warfarin Sodium 2 MG Tabs   Take 2 mg by mouth nightly.    Commonly known as:  COUMADIN           * Warfarin Sodium 1 MG Tabs   Take one mg tab on Sundays and wednesday   Commonly known as:  COUMADIN           * Notice

## (undated) NOTE — LETTER
[place] [address], [place], [place]    Authorization for Surgical Operation and Procedure                               I hereby authorize Muna Shrestha MD, my physician and his/her assistants (if applicable), which may include medical students, residents, and/or fellows, to perform the following surgical operation/ procedure and administer such anesthesia as may be determined necessary by my physician: Operation/Procedure name (s) COLONOSCOPY/ ESOPHAGOGASTRODUODENOSCOPY on Tyra Chandra   2.   I recognize that during the surgical operation/procedure, unforeseen conditions may necessitate additional or different procedures than those listed above.  I, therefore, further authorize and request that the above-named surgeon, assistants, or designees perform such procedures as are, in their judgment, necessary and desirable.    3.   My surgeon/physician has discussed prior to my surgery the potential benefits, risks and side effects of this procedure; the likelihood of achieving goals; and potential problems that might occur during recuperation.  They also discussed reasonable alternatives to the procedure, including risks, benefits, and side effects related to the alternatives and risks related to not receiving this procedure.  I have had all my questions answered and I acknowledge that no guarantee has been made as to the result that may be obtained.    4.   Should the need arise during my operation/procedure, which includes change of level of care prior to discharge, I also consent to the administration of blood and/or blood products.  Further, I understand that despite careful testing and screening of blood or blood products by collecting agencies, I may still be subject to ill effects as a result of receiving a blood transfusion and/or blood products.  The following are some, but not all, of the potential risks that can occur: fever and allergic reactions, hemolytic reactions,  transmission of diseases such as Hepatitis, AIDS and Cytomegalovirus (CMV) and fluid overload.  In the event that I wish to have an autologous transfusion of my own blood, or a directed donor transfusion, I will discuss this with my physician.  Check only if Refusing Blood or Blood Products  I understand refusal of blood or blood products as deemed necessary by my physician may have serious consequences to my condition to include possible death. I hereby assume responsibility for my refusal and release the hospital, its personnel, and my physicians from any responsibility for the consequences of my refusal.    o  Refuse   5.   I authorize the use of any specimen, organs, tissues, body parts or foreign objects that may be removed from my body during the operation/procedure for diagnosis, research or teaching purposes and their subsequent disposal by hospital authorities.  I also authorize the release of specimen test results and/or written reports to my treating physician on the hospital medical staff or other referring or consulting physicians involved in my care, at the discretion of the Pathologist or my treating physician.    6.   I consent to the photographing or videotaping of the operations or procedures to be performed, including appropriate portions of my body for medical, scientific, or educational purposes, provided my identity is not revealed by the pictures or by descriptive texts accompanying them.  If the procedure has been photographed/videotaped, the surgeon will obtain the original picture, image, videotape or CD.  The hospital will not be responsible for storage, release or maintenance of the picture, image, tape or CD.    7.   I consent to the presence of a  or observers in the operating room as deemed necessary by my physician or their designees.    8.   I recognize that in the event my procedure results in extended X-Ray/fluoroscopy time, I may develop a skin reaction.    9. If  I have a Do Not Attempt Resuscitation (DNAR) order in place, that status will be suspended while in the operating room, procedural suite, and during the recovery period unless otherwise explicitly stated by me (or a person authorized to consent on my behalf). The surgeon or my attending physician will determine when the applicable recovery period ends for purposes of reinstating the DNAR order.  10. Patients having a sterilization procedure: I understand that if the procedure is successful the results will be permanent and it will therefore be impossible for me to inseminate, conceive, or bear children.  I also understand that the procedure is intended to result in sterility, although the result has not been guaranteed.   11. I acknowledge that my physician has explained sedation/analgesia administration to me including the risk and benefits I consent to the administration of sedation/analgesia as may be necessary or desirable in the judgment of my physician.    I CERTIFY THAT I HAVE READ AND FULLY UNDERSTAND THE ABOVE CONSENT TO OPERATION and/or OTHER PROCEDURE.     ____________________________________  _________________________________        ______________________________  Signature of Patient    Signature of Responsible Person                Printed Name of Responsible Person                                      ____________________________________  _____________________________                ________________________________  Signature of Witness        Date  Time         Relationship to Patient    STATEMENT OF PHYSICIAN My signature below affirms that prior to the time of the procedure; I have explained to the patient and/or his/her legal representative, the risks and benefits involved in the proposed treatment and any reasonable alternative to the proposed treatment. I have also explained the risks and benefits involved in refusal of the proposed treatment and alternatives to the proposed treatment and have  answered the patient's questions. If I have a significant financial interest in a co-management agreement or a significant financial interest in any product or implant, or other significant relationship used in this procedure/surgery, I have disclosed this and had a discussion with my patient.     _____________________________________________________              _____________________________  (Signature of Physician)                                                                                         (Date)                                   (Time)  Patient Name: Tyra ELISE Vicky Chandra      : 1945      Printed: 10/9/2024     Medical Record #: [id number]                                      Page 1 of 1